# Patient Record
Sex: MALE | Race: WHITE | Employment: UNEMPLOYED | URBAN - METROPOLITAN AREA
[De-identification: names, ages, dates, MRNs, and addresses within clinical notes are randomized per-mention and may not be internally consistent; named-entity substitution may affect disease eponyms.]

---

## 2017-03-15 ENCOUNTER — OFFICE VISIT (OUTPATIENT)
Dept: INTERNAL MEDICINE CLINIC | Age: 67
End: 2017-03-15

## 2017-03-15 VITALS
BODY MASS INDEX: 29.89 KG/M2 | HEART RATE: 84 BPM | TEMPERATURE: 98.6 F | DIASTOLIC BLOOD PRESSURE: 76 MMHG | WEIGHT: 196.6 LBS | SYSTOLIC BLOOD PRESSURE: 146 MMHG | RESPIRATION RATE: 16 BRPM

## 2017-03-15 DIAGNOSIS — E11.42 TYPE 2 DIABETES, CONTROLLED, WITH PERIPHERAL NEUROPATHY (HCC): ICD-10-CM

## 2017-03-15 DIAGNOSIS — E78.2 MIXED HYPERLIPIDEMIA: ICD-10-CM

## 2017-03-15 DIAGNOSIS — Z12.11 COLON CANCER SCREENING: ICD-10-CM

## 2017-03-15 DIAGNOSIS — R05.9 COUGH: Primary | ICD-10-CM

## 2017-03-15 PROCEDURE — 99214 OFFICE O/P EST MOD 30 MIN: CPT | Performed by: INTERNAL MEDICINE

## 2017-03-15 RX ORDER — FLUTICASONE PROPIONATE 50 MCG
2 SPRAY, SUSPENSION (ML) NASAL DAILY
Qty: 1 BOTTLE | Refills: 5 | Status: SHIPPED | OUTPATIENT
Start: 2017-03-15 | End: 2018-03-15

## 2017-03-15 ASSESSMENT — PATIENT HEALTH QUESTIONNAIRE - PHQ9
SUM OF ALL RESPONSES TO PHQ QUESTIONS 1-9: 0
1. LITTLE INTEREST OR PLEASURE IN DOING THINGS: 0
SUM OF ALL RESPONSES TO PHQ9 QUESTIONS 1 & 2: 0
2. FEELING DOWN, DEPRESSED OR HOPELESS: 0

## 2017-03-16 DIAGNOSIS — E78.2 MIXED HYPERLIPIDEMIA: ICD-10-CM

## 2017-03-16 DIAGNOSIS — E11.42 TYPE 2 DIABETES, CONTROLLED, WITH PERIPHERAL NEUROPATHY (HCC): ICD-10-CM

## 2017-03-16 LAB
A/G RATIO: 1.3 (ref 1.1–2.2)
ALBUMIN SERPL-MCNC: 4.1 G/DL (ref 3.4–5)
ALP BLD-CCNC: 200 U/L (ref 40–129)
ALT SERPL-CCNC: 76 U/L (ref 10–40)
ANION GAP SERPL CALCULATED.3IONS-SCNC: 15 MMOL/L (ref 3–16)
AST SERPL-CCNC: 52 U/L (ref 15–37)
BILIRUB SERPL-MCNC: 1.2 MG/DL (ref 0–1)
BUN BLDV-MCNC: 7 MG/DL (ref 7–20)
CALCIUM SERPL-MCNC: 9.5 MG/DL (ref 8.3–10.6)
CHLORIDE BLD-SCNC: 100 MMOL/L (ref 99–110)
CHOLESTEROL, TOTAL: 101 MG/DL (ref 0–199)
CO2: 25 MMOL/L (ref 21–32)
CREAT SERPL-MCNC: 0.6 MG/DL (ref 0.8–1.3)
CREATININE URINE: 71.7 MG/DL (ref 39–259)
GFR AFRICAN AMERICAN: >60
GFR NON-AFRICAN AMERICAN: >60
GLOBULIN: 3.2 G/DL
GLUCOSE BLD-MCNC: 191 MG/DL (ref 70–99)
HDLC SERPL-MCNC: 37 MG/DL (ref 40–60)
LDL CHOLESTEROL CALCULATED: 37 MG/DL
MICROALBUMIN UR-MCNC: 1.4 MG/DL
MICROALBUMIN/CREAT UR-RTO: 19.5 MG/G (ref 0–30)
POTASSIUM SERPL-SCNC: 3.8 MMOL/L (ref 3.5–5.1)
SODIUM BLD-SCNC: 140 MMOL/L (ref 136–145)
TOTAL PROTEIN: 7.3 G/DL (ref 6.4–8.2)
TRIGL SERPL-MCNC: 135 MG/DL (ref 0–150)
VLDLC SERPL CALC-MCNC: 27 MG/DL

## 2017-03-17 DIAGNOSIS — E11.42 TYPE 2 DIABETES, CONTROLLED, WITH PERIPHERAL NEUROPATHY (HCC): ICD-10-CM

## 2017-03-17 DIAGNOSIS — R79.89 ELEVATED LFTS: ICD-10-CM

## 2017-03-17 DIAGNOSIS — E78.2 MIXED HYPERLIPIDEMIA: ICD-10-CM

## 2017-03-17 LAB
ESTIMATED AVERAGE GLUCOSE: 174.3 MG/DL
HBA1C MFR BLD: 7.7 %

## 2017-03-20 DIAGNOSIS — R79.89 ELEVATED LFTS: ICD-10-CM

## 2017-03-20 DIAGNOSIS — E78.2 MIXED HYPERLIPIDEMIA: ICD-10-CM

## 2017-03-20 DIAGNOSIS — E11.42 TYPE 2 DIABETES, CONTROLLED, WITH PERIPHERAL NEUROPATHY (HCC): ICD-10-CM

## 2017-03-20 LAB
HBV SURFACE AB TITR SER: <3.5 MUL/ML
HEPATITIS B SURFACE ANTIGEN INTERPRETATION: NORMAL
HEPATITIS C ANTIBODY INTERPRETATION: NORMAL
IRON SATURATION: 27 % (ref 20–50)
IRON: 75 UG/DL (ref 59–158)
TOTAL IRON BINDING CAPACITY: 281 UG/DL (ref 260–445)

## 2017-03-21 ENCOUNTER — HOSPITAL ENCOUNTER (OUTPATIENT)
Dept: ULTRASOUND IMAGING | Age: 67
Discharge: HOME | End: 2017-03-21
Attending: INTERNAL MEDICINE | Admitting: INTERNAL MEDICINE

## 2017-03-21 DIAGNOSIS — E11.42 TYPE 2 DIABETES, CONTROLLED, WITH PERIPHERAL NEUROPATHY (HCC): ICD-10-CM

## 2017-03-21 DIAGNOSIS — R79.89 ELEVATED LFTS: ICD-10-CM

## 2017-03-21 DIAGNOSIS — E11.42 TYPE 2 DIABETES MELLITUS WITH DIABETIC POLYNEUROPATHY (HCC): ICD-10-CM

## 2017-03-22 LAB — HEPATITIS B CORE TOTAL ANTIBODY: NEGATIVE

## 2017-03-23 LAB
HEPATITIS BE ANTIBODY: NEGATIVE
HEPATITIS BE ANTIGEN: NEGATIVE

## 2017-09-11 DIAGNOSIS — E11.42 TYPE 2 DIABETES, CONTROLLED, WITH PERIPHERAL NEUROPATHY (HCC): ICD-10-CM

## 2017-09-22 DIAGNOSIS — E78.2 MIXED HYPERLIPIDEMIA: ICD-10-CM

## 2017-09-22 RX ORDER — ATORVASTATIN CALCIUM 80 MG/1
80 TABLET, FILM COATED ORAL DAILY
Qty: 30 TABLET | Refills: 11 | Status: SHIPPED | OUTPATIENT
Start: 2017-09-22 | End: 2018-05-17 | Stop reason: SDUPTHER

## 2017-10-05 ENCOUNTER — HOSPITAL ENCOUNTER (OUTPATIENT)
Dept: GENERAL RADIOLOGY | Age: 67
Discharge: HOME | End: 2017-10-05
Attending: INTERNAL MEDICINE | Admitting: INTERNAL MEDICINE

## 2017-10-05 ENCOUNTER — OFFICE VISIT (OUTPATIENT)
Dept: INTERNAL MEDICINE CLINIC | Age: 67
End: 2017-10-05

## 2017-10-05 VITALS
RESPIRATION RATE: 16 BRPM | WEIGHT: 201 LBS | BODY MASS INDEX: 30.46 KG/M2 | HEIGHT: 68 IN | HEART RATE: 76 BPM | SYSTOLIC BLOOD PRESSURE: 134 MMHG | DIASTOLIC BLOOD PRESSURE: 84 MMHG

## 2017-10-05 DIAGNOSIS — M25.561 ACUTE PAIN OF RIGHT KNEE: ICD-10-CM

## 2017-10-05 DIAGNOSIS — E78.2 MIXED HYPERLIPIDEMIA: Primary | ICD-10-CM

## 2017-10-05 DIAGNOSIS — Z23 NEED FOR INFLUENZA VACCINATION: ICD-10-CM

## 2017-10-05 DIAGNOSIS — Z23 NEED FOR PNEUMOCOCCAL VACCINATION: ICD-10-CM

## 2017-10-05 DIAGNOSIS — E11.40 TYPE 2 DIABETES, CONTROLLED, WITH NEUROPATHY (HCC): ICD-10-CM

## 2017-10-05 LAB
ALBUMIN SERPL-MCNC: 4.5 GM/DL (ref 3.4–5)
ALP BLD-CCNC: 74 IU/L (ref 40–128)
ALT SERPL-CCNC: 29 U/L (ref 10–40)
ANION GAP SERPL CALCULATED.3IONS-SCNC: 14 MMOL/L (ref 4–16)
AST SERPL-CCNC: 19 IU/L (ref 15–37)
BILIRUB SERPL-MCNC: 1.5 MG/DL (ref 0–1)
BUN BLDV-MCNC: 13 MG/DL (ref 6–23)
CALCIUM SERPL-MCNC: 10 MG/DL (ref 8.3–10.6)
CHLORIDE BLD-SCNC: 105 MMOL/L (ref 99–110)
CHOLESTEROL: 149 MG/DL
CO2: 26 MMOL/L (ref 21–32)
CREAT SERPL-MCNC: 0.8 MG/DL (ref 0.9–1.3)
ESTIMATED AVERAGE GLUCOSE: 140 MG/DL
GFR AFRICAN AMERICAN: >60 ML/MIN/1.73M2
GFR NON-AFRICAN AMERICAN: >60 ML/MIN/1.73M2
GLUCOSE FASTING: 123 MG/DL (ref 70–99)
HBA1C MFR BLD: 6.5 % (ref 4.2–6.3)
HDLC SERPL-MCNC: 36 MG/DL
LDL CHOLESTEROL DIRECT: 82 MG/DL
POTASSIUM SERPL-SCNC: 4.2 MMOL/L (ref 3.5–5.1)
SODIUM BLD-SCNC: 145 MMOL/L (ref 135–145)
TOTAL PROTEIN: 7.4 GM/DL (ref 6.4–8.2)
TRIGL SERPL-MCNC: 185 MG/DL

## 2017-10-05 PROCEDURE — G0008 ADMIN INFLUENZA VIRUS VAC: HCPCS | Performed by: INTERNAL MEDICINE

## 2017-10-05 PROCEDURE — 90662 IIV NO PRSV INCREASED AG IM: CPT | Performed by: INTERNAL MEDICINE

## 2017-10-05 PROCEDURE — 99214 OFFICE O/P EST MOD 30 MIN: CPT | Performed by: INTERNAL MEDICINE

## 2017-10-05 PROCEDURE — G0009 ADMIN PNEUMOCOCCAL VACCINE: HCPCS | Performed by: INTERNAL MEDICINE

## 2017-10-05 PROCEDURE — 90732 PPSV23 VACC 2 YRS+ SUBQ/IM: CPT | Performed by: INTERNAL MEDICINE

## 2017-10-05 RX ORDER — NAPROXEN 375 MG/1
375 TABLET ORAL 2 TIMES DAILY WITH MEALS
Qty: 30 TABLET | Refills: 1 | Status: SHIPPED | OUTPATIENT
Start: 2017-10-05

## 2017-10-05 RX ORDER — GABAPENTIN 100 MG/1
CAPSULE ORAL
Qty: 180 CAPSULE | Refills: 11 | Status: SHIPPED | OUTPATIENT
Start: 2017-10-05 | End: 2018-04-05

## 2017-10-05 NOTE — PROGRESS NOTES
tenderness, no MCL tenderness. [facility]'s sign negative. ASSESSMENT:    1. Mixed hyperlipidemia    2. Type 2 diabetes, controlled, with neuropathy ([address]    3. Acute pain of right knee    4. Need for pneumococcal vaccination    5. Need for influenza vaccination        PLAN:    Morgan Vega was seen today for 6 month follow-up and neck pain. Diagnoses and all orders for this visit:    Mixed hyperlipidemia - check labs, cont statin  -     Comprehensive Metabolic Panel; Future  -     Lipid Panel; Future    Type 2 diabetes, controlled, with neuropathy ([address] - will check a1c  -     gabapentin (NEURONTIN) 100 MG capsule; TAKE 2 CAPSULES BY MOUTH THREE TIMES A DAY  -     Comprehensive Metabolic Panel; Future  -     Lipid Panel; Future  -     Hemoglobin A1C; Future    Acute pain of right knee - I suspect some OA that was exacerbated by the trauma. Will refer to PT, Tx with naprosyn  -     Comprehensive Metabolic Panel; Future  -     Lipid Panel; Future  -     External Referral To Physical Therapy    Need for pneumococcal vaccination  -     Pneumococcal polysaccharide vaccine 23-valent >= 1yo subcutaneous/IM (PNEUMOVAX 23)  -     Comprehensive Metabolic Panel; Future  -     Lipid Panel; Future    Need for influenza vaccination  -     INFLUENZA, HIGH DOSE, 65 YRS +, IM, PF, PREFILL SYR, 0.5ML (FLUZONE HD)    Other orders  -     Cancel: INFLUENZA, QUADV, 3 YRS AND OLDER, IM, MDV, 0.5ML (FLUZONE QUADV)  -     naproxen (NAPROSYN) 375 MG tablet;  Take 1 tablet by mouth 2 times daily (with meals)

## 2017-11-30 DIAGNOSIS — E11.42 TYPE 2 DIABETES, CONTROLLED, WITH PERIPHERAL NEUROPATHY (HCC): ICD-10-CM

## 2017-11-30 RX ORDER — INSULIN GLARGINE 100 [IU]/ML
INJECTION, SOLUTION SUBCUTANEOUS
Qty: 45 ML | Refills: 0 | Status: SHIPPED | OUTPATIENT
Start: 2017-11-30 | End: 2018-04-14 | Stop reason: SDUPTHER

## 2018-04-05 ENCOUNTER — OFFICE VISIT (OUTPATIENT)
Dept: INTERNAL MEDICINE CLINIC | Age: 68
End: 2018-04-05

## 2018-04-05 VITALS
DIASTOLIC BLOOD PRESSURE: 74 MMHG | SYSTOLIC BLOOD PRESSURE: 136 MMHG | WEIGHT: 207 LBS | HEART RATE: 64 BPM | BODY MASS INDEX: 31.47 KG/M2 | RESPIRATION RATE: 16 BRPM

## 2018-04-05 DIAGNOSIS — E78.2 MIXED HYPERLIPIDEMIA: ICD-10-CM

## 2018-04-05 DIAGNOSIS — E11.42 TYPE 2 DIABETES, CONTROLLED, WITH PERIPHERAL NEUROPATHY (HCC): Primary | ICD-10-CM

## 2018-04-05 DIAGNOSIS — E11.42 TYPE 2 DIABETES, CONTROLLED, WITH PERIPHERAL NEUROPATHY (HCC): ICD-10-CM

## 2018-04-05 DIAGNOSIS — Z12.11 COLON CANCER SCREENING: ICD-10-CM

## 2018-04-05 LAB
A/G RATIO: 1.7 (ref 1.1–2.2)
ALBUMIN SERPL-MCNC: 4.3 G/DL (ref 3.4–5)
ALP BLD-CCNC: 64 U/L (ref 40–129)
ALT SERPL-CCNC: 17 U/L (ref 10–40)
ANION GAP SERPL CALCULATED.3IONS-SCNC: 15 MMOL/L (ref 3–16)
AST SERPL-CCNC: 17 U/L (ref 15–37)
BILIRUB SERPL-MCNC: 1.5 MG/DL (ref 0–1)
BUN BLDV-MCNC: 13 MG/DL (ref 7–20)
CALCIUM SERPL-MCNC: 8.9 MG/DL (ref 8.3–10.6)
CHLORIDE BLD-SCNC: 104 MMOL/L (ref 99–110)
CHOLESTEROL, TOTAL: 93 MG/DL (ref 0–199)
CO2: 25 MMOL/L (ref 21–32)
CREAT SERPL-MCNC: 0.7 MG/DL (ref 0.8–1.3)
CREATININE URINE: 43.8 MG/DL (ref 39–259)
GFR AFRICAN AMERICAN: >60
GFR NON-AFRICAN AMERICAN: >60
GLOBULIN: 2.6 G/DL
GLUCOSE BLD-MCNC: 93 MG/DL (ref 70–99)
HDLC SERPL-MCNC: 34 MG/DL (ref 40–60)
LDL CHOLESTEROL CALCULATED: 39 MG/DL
MICROALBUMIN UR-MCNC: 15 MG/DL
MICROALBUMIN/CREAT UR-RTO: 342.5 MG/G (ref 0–30)
POTASSIUM SERPL-SCNC: 3.6 MMOL/L (ref 3.5–5.1)
SODIUM BLD-SCNC: 144 MMOL/L (ref 136–145)
TOTAL PROTEIN: 6.9 G/DL (ref 6.4–8.2)
TRIGL SERPL-MCNC: 100 MG/DL (ref 0–150)
VLDLC SERPL CALC-MCNC: 20 MG/DL

## 2018-04-05 PROCEDURE — 99213 OFFICE O/P EST LOW 20 MIN: CPT | Performed by: INTERNAL MEDICINE

## 2018-04-05 RX ORDER — PREGABALIN 25 MG/1
25 CAPSULE ORAL 3 TIMES DAILY
Qty: 60 CAPSULE | Refills: 5 | Status: SHIPPED | OUTPATIENT
Start: 2018-04-05 | End: 2018-10-02

## 2018-04-05 ASSESSMENT — PATIENT HEALTH QUESTIONNAIRE - PHQ9
2. FEELING DOWN, DEPRESSED OR HOPELESS: 0
SUM OF ALL RESPONSES TO PHQ9 QUESTIONS 1 & 2: 0
SUM OF ALL RESPONSES TO PHQ QUESTIONS 1-9: 0
1. LITTLE INTEREST OR PLEASURE IN DOING THINGS: 0

## 2018-04-06 DIAGNOSIS — R80.9 PROTEINURIA, UNSPECIFIED TYPE: ICD-10-CM

## 2018-04-06 DIAGNOSIS — R80.9 PROTEINURIA, UNSPECIFIED TYPE: Primary | ICD-10-CM

## 2018-04-06 LAB
ESTIMATED AVERAGE GLUCOSE: 139.9 MG/DL
HBA1C MFR BLD: 6.5 %

## 2018-04-06 RX ORDER — LISINOPRIL 10 MG/1
10 TABLET ORAL DAILY
Qty: 30 TABLET | Refills: 6 | Status: SHIPPED | OUTPATIENT
Start: 2018-04-06 | End: 2018-04-06 | Stop reason: SDUPTHER

## 2018-04-06 RX ORDER — LISINOPRIL 10 MG/1
10 TABLET ORAL DAILY
Qty: 30 TABLET | Refills: 6 | Status: SHIPPED | OUTPATIENT
Start: 2018-04-06 | End: 2018-05-17 | Stop reason: SDUPTHER

## 2018-04-14 DIAGNOSIS — E11.42 TYPE 2 DIABETES, CONTROLLED, WITH PERIPHERAL NEUROPATHY (HCC): ICD-10-CM

## 2018-04-16 RX ORDER — INSULIN GLARGINE 100 [IU]/ML
INJECTION, SOLUTION SUBCUTANEOUS
Qty: 45 ML | Refills: 1 | Status: SHIPPED | OUTPATIENT
Start: 2018-04-16 | End: 2018-05-17 | Stop reason: SDUPTHER

## 2018-05-17 DIAGNOSIS — E11.42 TYPE 2 DIABETES, CONTROLLED, WITH PERIPHERAL NEUROPATHY (HCC): ICD-10-CM

## 2018-05-17 DIAGNOSIS — E11.40 TYPE 2 DIABETES, CONTROLLED, WITH NEUROPATHY (HCC): ICD-10-CM

## 2018-05-17 DIAGNOSIS — E78.2 MIXED HYPERLIPIDEMIA: ICD-10-CM

## 2018-05-17 DIAGNOSIS — R80.9 PROTEINURIA, UNSPECIFIED TYPE: ICD-10-CM

## 2018-05-17 RX ORDER — LISINOPRIL 10 MG/1
10 TABLET ORAL DAILY
Qty: 90 TABLET | Refills: 3 | Status: SHIPPED | OUTPATIENT
Start: 2018-05-17

## 2018-05-17 RX ORDER — ATORVASTATIN CALCIUM 80 MG/1
80 TABLET, FILM COATED ORAL DAILY
Qty: 90 TABLET | Refills: 3 | Status: SHIPPED | OUTPATIENT
Start: 2018-05-17

## 2018-05-17 RX ORDER — GABAPENTIN 100 MG/1
CAPSULE ORAL
Qty: 540 CAPSULE | Refills: 3 | Status: SHIPPED | OUTPATIENT
Start: 2018-05-17 | End: 2019-05-17

## 2019-04-25 DIAGNOSIS — E11.42 TYPE 2 DIABETES, CONTROLLED, WITH PERIPHERAL NEUROPATHY (HCC): ICD-10-CM

## 2025-07-22 NOTE — MR AVS SNAPSHOT
cancers. BMI is not perfect. It may overestimate body fat in athletes and people who are more muscular. Even a small weight loss (between 5 and 10 percent of your current weight) by decreasing your calorie intake and becoming more physically active will help lower your risk of developing or worsening diseases associated with obesity. Learn more at: DietBetter.uk             Today's Medication Changes          These changes are accurate as of: 10/5/17  8:57 AM.  If you have any questions, ask your nurse or doctor. START taking these medications           naproxen 375 MG tablet   Commonly known as:  NAPROSYN   Instructions: Take 1 tablet by mouth 2 times daily (with meals)   Quantity:  30 tablet   Refills:  1   Started by:  Peggy Francisco MD         CHANGE how you take these medications           gabapentin 100 MG capsule   Commonly known as:  NEURONTIN   Instructions:  TAKE 2 CAPSULES BY MOUTH THREE TIMES A DAY   Quantity:  180 capsule   Refills:  11   What changed:  See the new instructions. Changed by:  Peggy Francisco MD            Where to Get Your Medications      These medications were sent to [address] - F [phone]  [facility]  [address] [facility] [address]     Phone:  [phone]     gabapentin 100 MG capsule    naproxen 375 MG tablet               Your Current Medications Are              gabapentin (NEURONTIN) 100 MG capsule TAKE 2 CAPSULES BY MOUTH THREE TIMES A DAY    naproxen (NAPROSYN) 375 MG tablet Take 1 tablet by mouth 2 times daily (with meals)    atorvastatin (LIPITOR) 80 MG tablet Take 1 tablet by mouth daily    insulin glargine (LANTUS SOLOSTAR) 100 UNIT/ML injection pen Inject 35 Units into the skin 2 times daily    fluticasone (FLONASE) 50 MCG/ACT nasal spray 2 sprays by Nasal route daily metFORMIN (GLUCOPHAGE) 1000 MG tablet TAKE 1 TABLET BY MOUTH TWO TIMES A DAY WITH MEALS    glucose blood VI test strips (ONE TOUCH TEST STRIPS) strip 1 each by In Vitro route 2 times daily As needed. Lancets MISC Test once daily or prn    Insulin Pen Needle 31G X 5 MM MISC 1 each by Does not apply route daily      Allergies           No Known Allergies      We Ordered/Performed the following           External Referral To Physical Therapy     Comments: The patient can be scheduled with any member of the group, including the provider with the first available appointments. INFLUENZA, QUADV, 3 YRS AND OLDER, IM, MDV, 0.5ML (FLUZONE QUADV)     Pneumococcal polysaccharide vaccine 23-valent >= 3yo subcutaneous/IM (PNEUMOVAX 23)          Additional Information        Basic Information     Date Of Birth Sex Race Ethnicity Preferred Language    1950 Male White Non-/Non  English      Problem List as of 10/5/2017                 Type 2 diabetes, controlled, with peripheral neuropathy ([facility] [address]    Allergic rhinitis    Hyperlipidemia      Immunizations as of 10/5/2017     Name Date    Influenza, High Dose 1/13/2015    Pneumococcal 13-valent Conjugate ([id number]) 2/24/2016    Pneumococcal Polysaccharide ([id number]) 1/1/2012    Tdap (Boostrix, Adacel) 12/13/2013    Zoster 2/10/2014      Preventive Care        Date Due    Eye Exam By An Eye Doctor 9/1/2016    Colonoscopy 1/1/2017    Pneumococcal Vaccines (two) for all adults aged 72 and over (2 of 2 - PPSV23) 2/24/2017    Yearly Flu Vaccine (1) 9/1/2017    Diabetic Foot Exam 3/15/2018    Hemoglobin A1C (Test For Long-Term Glucose Control) 3/16/2018    Urine Check For Kidney Problems 3/16/2018    Cholesterol Screening 3/16/2018    Tetanus Combination Vaccine (2 - Td) 12/13/2023            MyChart Signup           Our records indicate that you have an active [employer] account.     You can view your After Visit Summary by going to [email or web address] and logging in with your [employer] username and password. If you don't have a [employer] username and password but a parent or guardian has access to your record, the parent or guardian should login with their own [employer] username and password and access your record to view the After Visit Summary. Additional Information  If you have questions, please contact the physician practice where you receive care. Remember, [employer] is NOT to be used for urgent needs. For medical emergencies, dial 911. For questions regarding your [employer] account call [phone]. If you have a clinical question, please call your doctor's office. What Type Of Note Output Would You Prefer (Optional)?: Standard Output How Severe Are Your Spot(S)?: mild Have Your Spot(S) Been Treated In The Past?: has not been treated Hpi Title: Evaluation of Skin Lesions Additional History: Pt presents for FBSE. Last FBSE was 2021.